# Patient Record
Sex: FEMALE | Race: BLACK OR AFRICAN AMERICAN | NOT HISPANIC OR LATINO | Employment: OTHER | URBAN - METROPOLITAN AREA
[De-identification: names, ages, dates, MRNs, and addresses within clinical notes are randomized per-mention and may not be internally consistent; named-entity substitution may affect disease eponyms.]

---

## 2017-08-09 ENCOUNTER — HOSPITAL ENCOUNTER (INPATIENT)
Facility: HOSPITAL | Age: 82
LOS: 3 days | DRG: 378 | End: 2017-08-12
Admitting: HOSPITALIST
Payer: MEDICARE

## 2017-08-09 DIAGNOSIS — R53.1 WEAKNESS: ICD-10-CM

## 2017-08-09 DIAGNOSIS — K92.2 GASTROINTESTINAL HEMORRHAGE, UNSPECIFIED GASTROINTESTINAL HEMORRHAGE TYPE: Primary | ICD-10-CM

## 2017-08-09 LAB
ABO + RH BLD: NORMAL
ALBUMIN SERPL BCP-MCNC: 2.8 G/DL
ALP SERPL-CCNC: 80 U/L
ALT SERPL W/O P-5'-P-CCNC: <5 U/L
ANION GAP SERPL CALC-SCNC: 9 MMOL/L
AST SERPL-CCNC: 9 U/L
BASOPHILS # BLD AUTO: 0.03 K/UL
BASOPHILS NFR BLD: 0.4 %
BILIRUB SERPL-MCNC: 0.3 MG/DL
BLD GP AB SCN CELLS X3 SERPL QL: NORMAL
BUN SERPL-MCNC: 24 MG/DL
CALCIUM SERPL-MCNC: 8.7 MG/DL
CHLORIDE SERPL-SCNC: 109 MMOL/L
CO2 SERPL-SCNC: 24 MMOL/L
CREAT SERPL-MCNC: 1.1 MG/DL
DIFFERENTIAL METHOD: ABNORMAL
EOSINOPHIL # BLD AUTO: 0.4 K/UL
EOSINOPHIL NFR BLD: 4.7 %
ERYTHROCYTE [DISTWIDTH] IN BLOOD BY AUTOMATED COUNT: 19.7 %
EST. GFR  (AFRICAN AMERICAN): 49 ML/MIN/1.73 M^2
EST. GFR  (NON AFRICAN AMERICAN): 43 ML/MIN/1.73 M^2
GLUCOSE SERPL-MCNC: 88 MG/DL
HCT VFR BLD AUTO: 27.2 %
HGB BLD-MCNC: 8.3 G/DL
INR PPP: 1.2
LYMPHOCYTES # BLD AUTO: 1.1 K/UL
LYMPHOCYTES NFR BLD: 13.8 %
MCH RBC QN AUTO: 23.2 PG
MCHC RBC AUTO-ENTMCNC: 30.5 G/DL
MCV RBC AUTO: 76 FL
MONOCYTES # BLD AUTO: 0.5 K/UL
MONOCYTES NFR BLD: 6.6 %
NEUTROPHILS # BLD AUTO: 5.7 K/UL
NEUTROPHILS NFR BLD: 74.4 %
PLATELET # BLD AUTO: 243 K/UL
PMV BLD AUTO: 10.6 FL
POTASSIUM SERPL-SCNC: 4.3 MMOL/L
PROT SERPL-MCNC: 6 G/DL
PROTHROMBIN TIME: 12.8 SEC
RBC # BLD AUTO: 3.58 M/UL
SODIUM SERPL-SCNC: 142 MMOL/L
WBC # BLD AUTO: 7.67 K/UL

## 2017-08-09 PROCEDURE — 96365 THER/PROPH/DIAG IV INF INIT: CPT

## 2017-08-09 PROCEDURE — 96366 THER/PROPH/DIAG IV INF ADDON: CPT

## 2017-08-09 PROCEDURE — 12000002 HC ACUTE/MED SURGE SEMI-PRIVATE ROOM

## 2017-08-09 PROCEDURE — 85025 COMPLETE CBC W/AUTO DIFF WBC: CPT

## 2017-08-09 PROCEDURE — 63600175 PHARM REV CODE 636 W HCPCS

## 2017-08-09 PROCEDURE — C9113 INJ PANTOPRAZOLE SODIUM, VIA: HCPCS

## 2017-08-09 PROCEDURE — 86850 RBC ANTIBODY SCREEN: CPT

## 2017-08-09 PROCEDURE — 96361 HYDRATE IV INFUSION ADD-ON: CPT

## 2017-08-09 PROCEDURE — 86900 BLOOD TYPING SEROLOGIC ABO: CPT

## 2017-08-09 PROCEDURE — 93010 ELECTROCARDIOGRAM REPORT: CPT | Mod: ,,, | Performed by: INTERNAL MEDICINE

## 2017-08-09 PROCEDURE — 80053 COMPREHEN METABOLIC PANEL: CPT

## 2017-08-09 PROCEDURE — 83036 HEMOGLOBIN GLYCOSYLATED A1C: CPT

## 2017-08-09 PROCEDURE — 85610 PROTHROMBIN TIME: CPT

## 2017-08-09 PROCEDURE — 99285 EMERGENCY DEPT VISIT HI MDM: CPT | Mod: 25

## 2017-08-09 PROCEDURE — 96376 TX/PRO/DX INJ SAME DRUG ADON: CPT

## 2017-08-09 PROCEDURE — 93005 ELECTROCARDIOGRAM TRACING: CPT

## 2017-08-09 PROCEDURE — 25000003 PHARM REV CODE 250

## 2017-08-09 RX ORDER — ASPIRIN 325 MG
325 TABLET ORAL DAILY
Status: ON HOLD | COMMUNITY
End: 2017-08-11 | Stop reason: HOSPADM

## 2017-08-09 RX ORDER — SENNOSIDES 8.6 MG/1
1 TABLET ORAL DAILY
COMMUNITY

## 2017-08-09 RX ORDER — HYDROCODONE BITARTRATE AND ACETAMINOPHEN 5; 325 MG/1; MG/1
1 TABLET ORAL EVERY 6 HOURS PRN
Status: ON HOLD | COMMUNITY
End: 2017-08-11

## 2017-08-09 RX ORDER — SODIUM CHLORIDE 9 MG/ML
1000 INJECTION, SOLUTION INTRAVENOUS
Status: COMPLETED | OUTPATIENT
Start: 2017-08-09 | End: 2017-08-09

## 2017-08-09 RX ORDER — ERGOCALCIFEROL 1.25 MG/1
50000 CAPSULE ORAL
COMMUNITY

## 2017-08-09 RX ORDER — POLYETHYLENE GLYCOL 3350 17 G/17G
POWDER, FOR SOLUTION ORAL
COMMUNITY

## 2017-08-09 RX ORDER — PANTOPRAZOLE SODIUM 40 MG/10ML
40 INJECTION, POWDER, LYOPHILIZED, FOR SOLUTION INTRAVENOUS
Status: COMPLETED | OUTPATIENT
Start: 2017-08-09 | End: 2017-08-09

## 2017-08-09 RX ADMIN — PANTOPRAZOLE SODIUM 8 MG/HR: 40 INJECTION, POWDER, FOR SOLUTION INTRAVENOUS at 09:08

## 2017-08-09 RX ADMIN — SODIUM CHLORIDE 1000 ML: 0.9 INJECTION, SOLUTION INTRAVENOUS at 08:08

## 2017-08-09 RX ADMIN — PANTOPRAZOLE SODIUM 40 MG: 40 INJECTION, POWDER, FOR SOLUTION INTRAVENOUS at 08:08

## 2017-08-10 ENCOUNTER — ANESTHESIA EVENT (OUTPATIENT)
Dept: ENDOSCOPY | Facility: HOSPITAL | Age: 82
DRG: 378 | End: 2017-08-10
Payer: MEDICARE

## 2017-08-10 ENCOUNTER — SURGERY (OUTPATIENT)
Age: 82
End: 2017-08-10

## 2017-08-10 ENCOUNTER — ANESTHESIA (OUTPATIENT)
Dept: ENDOSCOPY | Facility: HOSPITAL | Age: 82
DRG: 378 | End: 2017-08-10
Payer: MEDICARE

## 2017-08-10 PROBLEM — D62 ACUTE BLOOD LOSS ANEMIA: Status: ACTIVE | Noted: 2017-08-10

## 2017-08-10 LAB
ESTIMATED AVG GLUCOSE: 134 MG/DL
HBA1C MFR BLD HPLC: 6.3 %
POCT GLUCOSE: 100 MG/DL (ref 70–110)
POCT GLUCOSE: 114 MG/DL (ref 70–110)
POCT GLUCOSE: 236 MG/DL (ref 70–110)
POCT GLUCOSE: 266 MG/DL (ref 70–110)

## 2017-08-10 PROCEDURE — 63600175 PHARM REV CODE 636 W HCPCS

## 2017-08-10 PROCEDURE — 63600175 PHARM REV CODE 636 W HCPCS: Performed by: NURSE ANESTHETIST, CERTIFIED REGISTERED

## 2017-08-10 PROCEDURE — 25000003 PHARM REV CODE 250

## 2017-08-10 PROCEDURE — C9113 INJ PANTOPRAZOLE SODIUM, VIA: HCPCS

## 2017-08-10 PROCEDURE — 43235 EGD DIAGNOSTIC BRUSH WASH: CPT | Performed by: INTERNAL MEDICINE

## 2017-08-10 PROCEDURE — 36415 COLL VENOUS BLD VENIPUNCTURE: CPT

## 2017-08-10 PROCEDURE — D9220A PRA ANESTHESIA: Mod: CRNA,,, | Performed by: NURSE ANESTHETIST, CERTIFIED REGISTERED

## 2017-08-10 PROCEDURE — 86677 HELICOBACTER PYLORI ANTIBODY: CPT

## 2017-08-10 PROCEDURE — D9220A PRA ANESTHESIA: Mod: ANES,,, | Performed by: ANESTHESIOLOGY

## 2017-08-10 PROCEDURE — 0DJ08ZZ INSPECTION OF UPPER INTESTINAL TRACT, VIA NATURAL OR ARTIFICIAL OPENING ENDOSCOPIC: ICD-10-PCS | Performed by: INTERNAL MEDICINE

## 2017-08-10 PROCEDURE — 25000003 PHARM REV CODE 250: Performed by: HOSPITALIST

## 2017-08-10 PROCEDURE — 37000009 HC ANESTHESIA EA ADD 15 MINS: Performed by: INTERNAL MEDICINE

## 2017-08-10 PROCEDURE — 11000001 HC ACUTE MED/SURG PRIVATE ROOM

## 2017-08-10 PROCEDURE — 25000003 PHARM REV CODE 250: Performed by: NURSE ANESTHETIST, CERTIFIED REGISTERED

## 2017-08-10 PROCEDURE — 37000008 HC ANESTHESIA 1ST 15 MINUTES: Performed by: INTERNAL MEDICINE

## 2017-08-10 PROCEDURE — 93005 ELECTROCARDIOGRAM TRACING: CPT

## 2017-08-10 RX ORDER — LIDOCAINE HCL/PF 100 MG/5ML
SYRINGE (ML) INTRAVENOUS
Status: DISCONTINUED | OUTPATIENT
Start: 2017-08-10 | End: 2017-08-10

## 2017-08-10 RX ORDER — PROPOFOL 10 MG/ML
VIAL (ML) INTRAVENOUS
Status: DISCONTINUED | OUTPATIENT
Start: 2017-08-10 | End: 2017-08-10

## 2017-08-10 RX ORDER — PROPOFOL 10 MG/ML
VIAL (ML) INTRAVENOUS
Status: COMPLETED
Start: 2017-08-10 | End: 2017-08-10

## 2017-08-10 RX ORDER — SODIUM CHLORIDE 9 MG/ML
INJECTION, SOLUTION INTRAVENOUS CONTINUOUS PRN
Status: DISCONTINUED | OUTPATIENT
Start: 2017-08-10 | End: 2017-08-10

## 2017-08-10 RX ORDER — HYDROCODONE BITARTRATE AND ACETAMINOPHEN 5; 325 MG/1; MG/1
1 TABLET ORAL EVERY 6 HOURS PRN
Status: DISCONTINUED | OUTPATIENT
Start: 2017-08-10 | End: 2017-08-12 | Stop reason: HOSPADM

## 2017-08-10 RX ORDER — GLUCAGON 1 MG
1 KIT INJECTION
Status: DISCONTINUED | OUTPATIENT
Start: 2017-08-10 | End: 2017-08-12 | Stop reason: HOSPADM

## 2017-08-10 RX ORDER — AMLODIPINE BESYLATE 5 MG/1
10 TABLET ORAL DAILY
Status: DISCONTINUED | OUTPATIENT
Start: 2017-08-10 | End: 2017-08-12 | Stop reason: HOSPADM

## 2017-08-10 RX ORDER — ATORVASTATIN CALCIUM 10 MG/1
20 TABLET, FILM COATED ORAL DAILY
Status: DISCONTINUED | OUTPATIENT
Start: 2017-08-10 | End: 2017-08-12 | Stop reason: HOSPADM

## 2017-08-10 RX ORDER — CARVEDILOL 6.25 MG/1
12.5 TABLET ORAL 2 TIMES DAILY WITH MEALS
Status: DISCONTINUED | OUTPATIENT
Start: 2017-08-10 | End: 2017-08-12 | Stop reason: HOSPADM

## 2017-08-10 RX ORDER — LIDOCAINE HYDROCHLORIDE 20 MG/ML
INJECTION, SOLUTION EPIDURAL; INFILTRATION; INTRACAUDAL; PERINEURAL
Status: COMPLETED
Start: 2017-08-10 | End: 2017-08-10

## 2017-08-10 RX ADMIN — SODIUM CHLORIDE: 0.9 INJECTION, SOLUTION INTRAVENOUS at 08:08

## 2017-08-10 RX ADMIN — CARVEDILOL 12.5 MG: 6.25 TABLET, FILM COATED ORAL at 11:08

## 2017-08-10 RX ADMIN — CARVEDILOL 12.5 MG: 6.25 TABLET, FILM COATED ORAL at 05:08

## 2017-08-10 RX ADMIN — LIDOCAINE HYDROCHLORIDE 50 MG: 20 INJECTION, SOLUTION INTRAVENOUS at 10:08

## 2017-08-10 RX ADMIN — PANTOPRAZOLE SODIUM 8 MG/HR: 40 INJECTION, POWDER, FOR SOLUTION INTRAVENOUS at 05:08

## 2017-08-10 RX ADMIN — HYDROCODONE BITARTRATE AND ACETAMINOPHEN 1 TABLET: 5; 325 TABLET ORAL at 06:08

## 2017-08-10 RX ADMIN — PROPOFOL 50 MG: 10 INJECTION, EMULSION INTRAVENOUS at 10:08

## 2017-08-10 RX ADMIN — PROPOFOL 20 MG: 10 INJECTION, EMULSION INTRAVENOUS at 10:08

## 2017-08-10 RX ADMIN — ATORVASTATIN CALCIUM 20 MG: 10 TABLET, FILM COATED ORAL at 11:08

## 2017-08-10 RX ADMIN — PROPOFOL: 10 INJECTION, EMULSION INTRAVENOUS at 09:08

## 2017-08-10 RX ADMIN — AMLODIPINE BESYLATE 10 MG: 5 TABLET ORAL at 11:08

## 2017-08-10 RX ADMIN — LIDOCAINE HYDROCHLORIDE: 20 INJECTION, SOLUTION EPIDURAL; INFILTRATION; INTRACAUDAL; PERINEURAL at 09:08

## 2017-08-10 NOTE — PROVIDER PROGRESS NOTES - EMERGENCY DEPT.
Encounter Date: 8/9/2017    ED Physician Progress Notes        Physician Note:   We have now identified that the Xarelto  Was stopped Today at the nursing home.  Son is present and son the consent for blood if needed.  NO labs sent from NH    Dr. Ellis admit, q4 h/h consult GI    Dr. Jonny HAIDER , keep npo and EGD in a.m.

## 2017-08-10 NOTE — SUBJECTIVE & OBJECTIVE
Past Medical History:   Diagnosis Date    Anemia     Arthritis     Diabetes mellitus     Encounter for blood transfusion     History of blood clots     Hyperlipidemia     Hypertension     Stroke     25yrs ago       Past Surgical History:   Procedure Laterality Date    APPENDECTOMY      ILIAC ARTERY STENT         Review of patient's allergies indicates:  No Known Allergies    No current facility-administered medications on file prior to encounter.      Current Outpatient Prescriptions on File Prior to Encounter   Medication Sig    amlodipine (NORVASC) 10 MG tablet Take 10 mg by mouth once daily.    atorvastatin (LIPITOR) 20 MG tablet Take 20 mg by mouth once daily.    carvedilol (COREG) 12.5 MG tablet Take 12.5 mg by mouth 2 (two) times daily with meals.    gabapentin (NEURONTIN) 300 MG capsule     omeprazole (PRILOSEC) 20 MG capsule     albuterol-ipratropium 2.5mg-0.5mg/3mL (DUO-NEB) 0.5 mg-3 mg(2.5 mg base)/3 mL nebulizer solution Take 3 mLs by nebulization every 4 (four) hours.    clopidogrel (PLAVIX) 75 mg tablet Take 75 mg by mouth once daily.    insulin glargine (LANTUS) 100 unit/mL injection Inject 10 Units into the skin every evening.    MYRBETRIQ 50 mg Tb24     rivaroxaban (XARELTO) 20 mg Tab Take 1 tablet (20 mg total) by mouth once daily.    trazodone (DESYREL) 50 MG tablet Take 25 mg by mouth every evening.      Family History     None        Social History Main Topics    Smoking status: Former Smoker    Smokeless tobacco: Never Used    Alcohol use No    Drug use: No    Sexual activity: Not on file     Review of Systems   Constitutional: Negative for activity change and appetite change.   HENT: Negative for congestion and dental problem.    Eyes: Negative for discharge and itching.   Respiratory: Negative for apnea and chest tightness.    Cardiovascular: Negative for chest pain and leg swelling.   Gastrointestinal: Positive for blood in stool. Negative for abdominal distention  and abdominal pain.   Endocrine: Negative for cold intolerance and heat intolerance.   Genitourinary: Negative for difficulty urinating.   Musculoskeletal: Negative for arthralgias.   Skin: Negative for color change.   Allergic/Immunologic: Negative for environmental allergies.   Neurological: Negative for dizziness and facial asymmetry.   Hematological: Negative for adenopathy.   Psychiatric/Behavioral: Negative for agitation and behavioral problems.     Objective:     Vital Signs (Most Recent):  Temp: 97.4 °F (36.3 °C) (08/10/17 0856)  Pulse: (P) 62 (08/10/17 0900)  Resp: 18 (08/10/17 0856)  BP: (!) 180/74 (08/10/17 0900)  SpO2: 98 % (08/10/17 0856) Vital Signs (24h Range):  Temp:  [97.4 °F (36.3 °C)-98.2 °F (36.8 °C)] 97.4 °F (36.3 °C)  Pulse:  [60-68] (P) 62  Resp:  [16-25] 18  SpO2:  [95 %-99 %] 98 %  BP: (128-180)/(64-80) 180/74     Weight: 61.2 kg (135 lb)  Body mass index is 23.17 kg/m².    Physical Exam   Constitutional: No distress.   HENT:   Head: Atraumatic.   Eyes: EOM are normal. Pupils are equal, round, and reactive to light.   Neck: Normal range of motion. Neck supple.   Cardiovascular: Normal rate and regular rhythm.    Pulmonary/Chest: Effort normal and breath sounds normal.   Abdominal: Soft. Bowel sounds are normal. She exhibits no distension. There is no tenderness.   Musculoskeletal: Normal range of motion. She exhibits no deformity.   Neurological: She is alert. No cranial nerve deficit. Coordination normal.   Skin: Skin is warm and dry. She is not diaphoretic.   Psychiatric: She has a normal mood and affect. Her behavior is normal.        Significant Labs:   BMP:   Recent Labs  Lab 08/09/17 1930   GLU 88      K 4.3      CO2 24   BUN 24   CREATININE 1.1   CALCIUM 8.7     CBC:   Recent Labs  Lab 08/09/17 1930   WBC 7.67   HGB 8.3*   HCT 27.2*

## 2017-08-10 NOTE — ANESTHESIA POSTPROCEDURE EVALUATION
"Anesthesia Post Evaluation    Patient: Kiki Rosario    Procedure(s) Performed: Procedure(s) (LRB):  ESOPHAGOGASTRODUODENOSCOPY (EGD) (Left)    Final Anesthesia Type: general  Patient location during evaluation: GI PACU  Patient participation: Yes- Able to Participate  Level of consciousness: awake and alert  Post-procedure vital signs: reviewed and stable  Pain management: adequate  Airway patency: patent  PONV status at discharge: No PONV  Anesthetic complications: no      Cardiovascular status: blood pressure returned to baseline and hemodynamically stable  Respiratory status: unassisted  Hydration status: euvolemic  Follow-up not needed.        Visit Vitals  BP (!) 113/53   Pulse 61   Temp 36.6 °C (97.9 °F)   Resp 18   Ht 5' 4" (1.626 m)   Wt 61.2 kg (135 lb)   LMP  (LMP Unknown)   SpO2 96%   Breastfeeding? No   BMI 23.17 kg/m²       Pain/Nguyễn Score: Pain Assessment Performed: Yes (8/10/2017 10:30 AM)  Presence of Pain: denies (8/10/2017 10:30 AM)  Nguyễn Score: 9 (8/10/2017 10:30 AM)      "

## 2017-08-10 NOTE — ED PROVIDER NOTES
Encounter Date: 8/9/2017    SCRIBE #1 NOTE: I, Bakari Perez, am scribing for, and in the presence of,  Jamie Goetz MD. I have scribed the following portions of the note - Other sections scribed: HPI and ROS.       History     Chief Complaint   Patient presents with    Abnormal Lab     Pt sent to ED for evaluation of low H&H (labs not sent) from [facility] due to heavy vaginal bleeding.      CC: Abnormal Lab     HPI: This 89 y.o F with Anemia; Arthritis; DM; Encounter for blood transfusion; History of blood clots; HLD; HTN; and Stroke presents to the ED via EMS from [facility] for emergent evaluation of low H&H. The labs were not sent, but the pt's nursing home reports heavy vaginal bleeding. Pt's last meal was yesterday. The pt denies nausea, emesis, abdominal pain, chest pain, fever, chills, sore throat, rhinorrhea, cough and chest. No prior tx.     Pt takes Aspirin daily.         The history is provided by the patient, the EMS personnel and the nursing home. No  was used.     Review of patient's allergies indicates:  No Known Allergies  Past Medical History:   Diagnosis Date    Anemia     Arthritis     Diabetes mellitus     Encounter for blood transfusion     History of blood clots     Hyperlipidemia     Hypertension     Stroke     25yrs ago     Past Surgical History:   Procedure Laterality Date    APPENDECTOMY      ILIAC ARTERY STENT       History reviewed. No pertinent family history.  Social History   Substance Use Topics    Smoking status: Former Smoker    Smokeless tobacco: Never Used    Alcohol use No     Review of Systems   Constitutional: Negative for chills, diaphoresis and fever.   HENT: Negative for rhinorrhea and sore throat.    Eyes: Negative for redness.   Respiratory: Negative for cough and shortness of breath.    Cardiovascular: Negative for chest pain.   Gastrointestinal: Negative for abdominal pain, diarrhea, nausea and vomiting.   Genitourinary: Positive  for vaginal bleeding. Negative for dysuria, frequency and urgency.   Musculoskeletal: Negative for back pain and neck pain.   Skin: Negative for rash.   Psychiatric/Behavioral: The patient is not nervous/anxious.        Physical Exam     Initial Vitals [08/09/17 1819]   BP Pulse Resp Temp SpO2   (!) 141/74 64 18 98.2 °F (36.8 °C) 98 %      MAP       96.33         Physical Exam well-developed well-nourished black female awake slightly confused  HEENT exam  bilateral iridectomies  Movements full TMs ceruminous nares benign dentures in place moist mucous membranes posterior pharynx benign  Neck no significant adenopathy  Lungs clear no rales rhonchi or wheezing next lung cardiovascular regular rate and rhythm no murmur rub or gallop  Abdomen bowel sounds positive soft nontender no masses or prostatomegaly   no evidence of bleeding and vaginal vault somewhat atrophic meatus without evidence trauma  Rectal Black melanotic stool immediately heme positive no masses  Musca skeletal well-healed right BKA  Skin without breakdown    ED Course   Procedures  Labs Reviewed   CBC W/ AUTO DIFFERENTIAL - Abnormal; Notable for the following:        Result Value    RBC 3.58 (*)     Hemoglobin 8.3 (*)     Hematocrit 27.2 (*)     MCV 76 (*)     MCH 23.2 (*)     MCHC 30.5 (*)     RDW 19.7 (*)     Gran% 74.4 (*)     Lymph% 13.8 (*)     All other components within normal limits   COMPREHENSIVE METABOLIC PANEL - Abnormal; Notable for the following:     Albumin 2.8 (*)     AST 9 (*)     ALT <5 (*)     eGFR if  49 (*)     eGFR if non  43 (*)     All other components within normal limits   PROTIME-INR - Abnormal; Notable for the following:     Prothrombin Time 12.8 (*)     All other components within normal limits   TYPE & SCREEN     EKG Readings: (Independently Interpreted)   Sinus rhythm 64 bpm monitor Nitropaste or possibly normal QT early criteria for LVH nonspecific ST-T check              MDM patient  with history of nursing home labs showing low H&H.  Patient states she had nothing to eat today she just didn't want to.  Nursing home center of vaginal bleeding but on physical examination appears to be having melanotic stools.  Concerning for peptic ulcer disease.  Old notes reveals she is on Zarontin 2014 the current nursing home records reveal only Plavix.  Differential diagnosis includes AV malformations Crohn's ulcerative colitis duodenal ulcer          Scribe Attestation:   Scribe #1: I performed the above scribed service and the documentation accurately describes the services I performed. I attest to the accuracy of the note.    Attending Attestation:           Physician Attestation for Scribe:  Physician Attestation Statement for Scribe #1: I, Jamie Goetz MD, reviewed documentation, as scribed by Bakari Perez in my presence, and it is both accurate and complete.                 ED Course     Clinical Impression:   The primary encounter diagnosis was Gastrointestinal hemorrhage, unspecified gastrointestinal hemorrhage type. A diagnosis of Weakness was also pertinent to this visit.                           Jamie Goetz MD  08/09/17 1626       Jamie Goetz MD  08/09/17 4443

## 2017-08-10 NOTE — ED TRIAGE NOTES
Pt presents to the ed with complaints of vaginal bleeding from [facility] that started today according to family. Upon examination, pt found to be bleeding from rectum. Pt had blood work done earlier today that showed low H&H. Pt AAOx2, able to answer some questions. Pt had bloody diaper with dark red stools and clots present when roomed. Pt skin warm and dry.

## 2017-08-10 NOTE — ANESTHESIA PREPROCEDURE EVALUATION
08/10/2017  Kiki Rosario is a 89 y.o., female.    Anesthesia Evaluation    I have reviewed the Patient Summary Reports.    I have reviewed the Nursing Notes.      Review of Systems  Anesthesia Hx:  No problems with previous Anesthesia    Social:  Non-Smoker    Hematology/Oncology:         -- Anemia: Hematology Comments: Pt on xarelto (remote hx of PE) & plavix (lower extremity stent)   Cardiovascular:   Exercise tolerance: poor Denies Pacemaker. Hypertension  Denies Valvular problems/Murmurs.  Denies MI.  Denies CAD.    Denies CABG/stent.   Denies Angina. CHF            Pulmonary:  Pulmonary Normal    Renal/:   Chronic Renal Disease, CRI    Hepatic/GI:   Denies Liver Disease. Denies Hepatitis. Pt admitted w/ ?black stools, & vaginal bleeding   Neurological:   CVA Denies Seizures.    Endocrine:   Diabetes, type 2 Denies Hypothyroidism. Denies Hyperthyroidism.    Psych:   Psychiatric History          Physical Exam  General:  Well nourished    Airway/Jaw/Neck:  AIRWAY FINDINGS: Normal           Mental Status:  Pt know who she is and where we are but is not clear why we are doing procedure or who is president       Anesthesia Plan  Type of Anesthesia, risks & benefits discussed:  Anesthesia Type:  general  Patient's Preference:   Intra-op Monitoring Plan: standard ASA monitors  Intra-op Monitoring Plan Comments:   Post Op Pain Control Plan:   Post Op Pain Control Plan Comments:   Induction:   IV  Beta Blocker:  Patient is on a Beta-Blocker and has received one dose within the past 24 hours (No further documentation required).       Informed Consent: Patient representative understands risks and agrees with Anesthesia plan.  Questions answered. Anesthesia consent signed with patient representative.  ASA Score: 3     Day of Surgery Review of History & Physical:    H&P update referred to the surgeon.      Anesthesia Plan Notes: Discussed risks of Anesthesia with pt's granddaughter, she gave consent        Ready For Surgery From Anesthesia Perspective.

## 2017-08-10 NOTE — CONSULTS
"Chief Complaint:  "My blood levels were low."    HPI:  The patient is a 89 year old woman with a history of HTN, DM, CVA, and arthritis presenting with anemia.  She was at the nursing home and she was found to have an anemia.  Apparently, the nursing home stated she had heavy vaginal bleeding, however, when she arrived to the Emergency Room, it was thought that she had black stools.  The patient was on plavix and xarelto.  She denies having any abdominal pain, weight loss, nausea, emesis, diarrhea, or constipation.  The patient underwent a colonoscopy in 2011 for hematochezia and she had a poor prep with pandiverticulosis.    Past Medical History:   Diagnosis Date    Anemia     Arthritis     Diabetes mellitus     Encounter for blood transfusion     History of blood clots     Hyperlipidemia     Hypertension     Stroke     25yrs ago     Past Surgical History:   Procedure Laterality Date    APPENDECTOMY      ILIAC ARTERY STENT       FamHx:  No history of liver or colon cancer    SocialHx:  No tobacco, EtOH, or illicit drugs     amlodipine  10 mg Oral Daily    carvedilol  12.5 mg Oral BID WM     Review of patient's allergies indicates:  No Known Allergies    ROS:  No chest pain or dyspnea.  No dysuria.  No heartburn or dysphagia.  Otherwise as stated above.  Ten other systems negative.    Vitals:    08/10/17 0109 08/10/17 0139 08/10/17 0240 08/10/17 0257   BP: (!) 145/72 (!) 145/68 (!) 151/71 (!) 150/74   Pulse: 64 62 66 68   Resp: 17 (!) 23 (!) 25 20   Temp:    97.9 °F (36.6 °C)   TempSrc:    Oral   SpO2: 95% 98% 98% 96%   Weight:       Height:         P.E.:  GEN: A x O x 3, NAD  SKIN: No jaundice  HEENT: EOMI, PERRL, anicteric sclera  CV: RRR, no M/R/G  Chest: CTA B  Abdomen: soft, NTND, normoactive BS  Ext: No C/C/E.  2+ dorsalis pedis pulses B  Neuro: No asterixes or tremors.  CN II-XII intact  Musculoskeletal: 5/5 strength bilaterally    Labs:  Recent Results (from the past 336 hour(s))   CBC auto " differential    Collection Time: 08/09/17  7:30 PM   Result Value Ref Range    WBC 7.67 3.90 - 12.70 K/uL    Hemoglobin 8.3 (L) 12.0 - 16.0 g/dL    Hematocrit 27.2 (L) 37.0 - 48.5 %    Platelets 243 150 - 350 K/uL     CMP  Sodium   Date Value Ref Range Status   08/09/2017 142 136 - 145 mmol/L Final     Potassium   Date Value Ref Range Status   08/09/2017 4.3 3.5 - 5.1 mmol/L Final     Chloride   Date Value Ref Range Status   08/09/2017 109 95 - 110 mmol/L Final     CO2   Date Value Ref Range Status   08/09/2017 24 23 - 29 mmol/L Final     Glucose   Date Value Ref Range Status   08/09/2017 88 70 - 110 mg/dL Final     BUN, Bld   Date Value Ref Range Status   08/09/2017 24 10 - 30 mg/dL Final     Creatinine   Date Value Ref Range Status   08/09/2017 1.1 0.5 - 1.4 mg/dL Final     Calcium   Date Value Ref Range Status   08/09/2017 8.7 8.7 - 10.5 mg/dL Final     Total Protein   Date Value Ref Range Status   08/09/2017 6.0 6.0 - 8.4 g/dL Final     Albumin   Date Value Ref Range Status   08/09/2017 2.8 (L) 3.5 - 5.2 g/dL Final     Total Bilirubin   Date Value Ref Range Status   08/09/2017 0.3 0.1 - 1.0 mg/dL Final     Comment:     For infants and newborns, interpretation of results should be based  on gestational age, weight and in agreement with clinical  observations.  Premature Infant recommended reference ranges:  Up to 24 hours.............<8.0 mg/dL  Up to 48 hours............<12.0 mg/dL  3-5 days..................<15.0 mg/dL  6-29 days.................<15.0 mg/dL       Alkaline Phosphatase   Date Value Ref Range Status   08/09/2017 80 55 - 135 U/L Final     AST   Date Value Ref Range Status   08/09/2017 9 (L) 10 - 40 U/L Final     ALT   Date Value Ref Range Status   08/09/2017 <5 (L) 10 - 44 U/L Final     Anion Gap   Date Value Ref Range Status   08/09/2017 9 8 - 16 mmol/L Final     eGFR if    Date Value Ref Range Status   08/09/2017 49 (A) >60 mL/min/1.73 m^2 Final     eGFR if non African American    Date Value Ref Range Status   08/09/2017 43 (A) >60 mL/min/1.73 m^2 Final     Comment:     Calculation used to obtain the estimated glomerular filtration  rate (eGFR) is the CKD-EPI equation. Since race is unknown   in our information system, the eGFR values for   -American and Non--American patients are given   for each creatinine result.           Recent Labs  Lab 08/09/17  1930   INR 1.2     A/P: The patient is a 89 year old woman with a history of HTN, DM, CVA, and arthritis presenting with anemia.  1.  Anemia - it was initially reported from the nursing home that she has heavy vaginal bleeding.  In the Emergency Room, it was thought that she had black stools.  Plavix and xarelto are being held.  The protonix drip can be continued.  Despite her age, the patient wishes to undergo an EGD to evaluate for a source of bleeding.  I have explained the risks, benefits, and alternatives of the procedure in detail.  The patient voices understanding and all questions have been answered.  The patient agrees to proceed as planned.    Thank you for this consult.    Gastroenterology Addendum:  The son was contacted to obtain consents.  He is not sure if he would like for the patient to undergo an EGD at her age and he wishes to discuss this with other family members.  She can remain NPO for now until they make a decision.

## 2017-08-10 NOTE — ASSESSMENT & PLAN NOTE
She was on plavix and xarelto at home,likely upper GI,family did not want EGD,will continue with PPI drip,monitor CBC,pR BC as needed.hold a; anticoagulation.

## 2017-08-10 NOTE — TRANSFER OF CARE
"Anesthesia Transfer of Care Note    Patient: Kiki Rosario    Procedure(s) Performed: Procedure(s) (LRB):  ESOPHAGOGASTRODUODENOSCOPY (EGD) (Left)    Patient location: GI    Anesthesia Type: general    Transport from OR: Transported from OR on room air with adequate spontaneous ventilation    Post pain: adequate analgesia    Post assessment: no apparent anesthetic complications    Post vital signs: stable    Level of consciousness: awake and alert    Nausea/Vomiting: no nausea/vomiting    Complications: none    Transfer of care protocol was followed      Last vitals:   Visit Vitals  BP (!) 113/53   Pulse 61   Temp 36.6 °C (97.9 °F)   Resp 18   Ht 5' 4" (1.626 m)   Wt 61.2 kg (135 lb)   LMP  (LMP Unknown)   SpO2 96%   Breastfeeding? No   BMI 23.17 kg/m²     "

## 2017-08-10 NOTE — OR NURSING
EGD RESULTS DISCUSSED WITH RHONDA, CHARGE NURSE; HER PRIMARY CARE NURSE WAS UNAVAILABLE TO DISCUSS THE CASE PRESENTLY

## 2017-08-10 NOTE — HPI
This 89 y.o F with Anemia; Arthritis; DM; Encounter for blood transfusion; History of blood clots; HLD; HTN; and Stroke presents to the ED via EMS from [facility] for emergent evaluation of low H&H. The labs were not sent, but the pt's nursing home reports heavy vaginal bleeding. The pt denies nausea, emesis, abdominal pain, chest pain, fever, chills, sore throat, rhinorrhea, cough and chest. No prior tx,on further evaluation in ER,has been seen patient has no vaginal bleeding,but melanotic stool,she has been started on PPI drip and GI was consulted,paln was for EGD,but son did not want any procedure,no sign of active bleeding at this time.

## 2017-08-10 NOTE — DISCHARGE SUMMARY
[facility]  Discharge Summary      Admit Date: 8/9/2017    Discharge Date and Time:  08/10/2017 10:20 AM    Attending Physician: Dharmesh Swift MD     Reason for Admission: Anemia    Procedures Performed: Procedure(s) (LRB):  ESOPHAGOGASTRODUODENOSCOPY (EGD) (Left)    Hospital Course (synopsis of major diagnoses, care, treatment, and services provided during the course of the hospital stay): Ongoing     Consults: GI    Significant Diagnostic Studies: EGD    Final Diagnoses:    Principal Problem: Gastrointestinal hemorrhage   Secondary Diagnoses: EGD    Discharged Condition: good    Disposition: Admitted as an Inpatient    Follow Up/Patient Instructions: Follow-up with referring physician             Resume previous diet and activity.    Medications:  Transfer Medications (for Discharge Readmit only):   Current Facility-Administered Medications   Medication Dose Route Frequency Provider Last Rate Last Dose    amlodipine tablet 10 mg  10 mg Oral Daily Jamie Goetz MD        atorvastatin tablet 20 mg  20 mg Oral Daily Dharmesh Swift MD        carvedilol tablet 12.5 mg  12.5 mg Oral BID  Jamie Goetz MD        dextrose 50% injection 12.5 g  12.5 g Intravenous PRN Jamie Goetz MD        glucagon (human recombinant) injection 1 mg  1 mg Intramuscular PRN Jamie Goetz MD        lidocaine  20 mg/mL (2%) 20 mg/mL (2 %) injection             propofol (DIPRIVAN) 10 mg/mL IVP injection              Facility-Administered Medications Ordered in Other Encounters   Medication Dose Route Frequency Provider Last Rate Last Dose    0.9%  NaCl infusion    Continuous PRN Kimmie H Fischtziur, CRNA        lidocaine (cardiac) injection    PRN Kimmie ISAURO Fischtziur, CRNA   50 mg at 08/10/17 1013    propofol (DIPRIVAN) 10 mg/mL infusion    PRN Kimmie H Fischtziur, CRNA   20 mg at 08/10/17 1015     No discharge procedures on file.

## 2017-08-10 NOTE — H&P
[facility] Medicine  History & Physical    Patient Name: Kiki Rosario  MRN: [id number]  Admission Date: 8/9/2017  Attending Physician: Dharmesh Swift MD   Primary Care Provider: Karl Machuca MD         Patient information was obtained from patient and ER records.     Subjective:     Principal Problem:Gastrointestinal hemorrhage    Chief Complaint:   Chief Complaint   Patient presents with    Abnormal Lab     Pt sent to ED for evaluation of low H&H (labs not sent) from [facility] due to heavy vaginal bleeding.         HPI:  This 89 y.o F with Anemia; Arthritis; DM; Encounter for blood transfusion; History of blood clots; HLD; HTN; and Stroke presents to the ED via EMS from [facility] for emergent evaluation of low H&H. The labs were not sent, but the pt's nursing home reports heavy vaginal bleeding. The pt denies nausea, emesis, abdominal pain, chest pain, fever, chills, sore throat, rhinorrhea, cough and chest. No prior tx,on further evaluation in ER,has been seen patient has no vaginal bleeding,but melanotic stool,she has been started on PPI drip and GI was consulted,paln was for EGD,but son did not want any procedure,no sign of active bleeding at this time.    Past Medical History:   Diagnosis Date    Anemia     Arthritis     Diabetes mellitus     Encounter for blood transfusion     History of blood clots     Hyperlipidemia     Hypertension     Stroke     25yrs ago       Past Surgical History:   Procedure Laterality Date    APPENDECTOMY      ILIAC ARTERY STENT         Review of patient's allergies indicates:  No Known Allergies    No current facility-administered medications on file prior to encounter.      Current Outpatient Prescriptions on File Prior to Encounter   Medication Sig    amlodipine (NORVASC) 10 MG tablet Take 10 mg by mouth once daily.    atorvastatin (LIPITOR) 20 MG tablet Take 20 mg by mouth once daily.    carvedilol (COREG) 12.5 MG tablet  Take 12.5 mg by mouth 2 (two) times daily with meals.    gabapentin (NEURONTIN) 300 MG capsule     omeprazole (PRILOSEC) 20 MG capsule     albuterol-ipratropium 2.5mg-0.5mg/3mL (DUO-NEB) 0.5 mg-3 mg(2.5 mg base)/3 mL nebulizer solution Take 3 mLs by nebulization every 4 (four) hours.    clopidogrel (PLAVIX) 75 mg tablet Take 75 mg by mouth once daily.    insulin glargine (LANTUS) 100 unit/mL injection Inject 10 Units into the skin every evening.    MYRBETRIQ 50 mg Tb24     rivaroxaban (XARELTO) 20 mg Tab Take 1 tablet (20 mg total) by mouth once daily.    trazodone (DESYREL) 50 MG tablet Take 25 mg by mouth every evening.      Family History     None        Social History Main Topics    Smoking status: Former Smoker    Smokeless tobacco: Never Used    Alcohol use No    Drug use: No    Sexual activity: Not on file     Review of Systems   Constitutional: Negative for activity change and appetite change.   HENT: Negative for congestion and dental problem.    Eyes: Negative for discharge and itching.   Respiratory: Negative for apnea and chest tightness.    Cardiovascular: Negative for chest pain and leg swelling.   Gastrointestinal: Positive for blood in stool. Negative for abdominal distention and abdominal pain.   Endocrine: Negative for cold intolerance and heat intolerance.   Genitourinary: Negative for difficulty urinating.   Musculoskeletal: Negative for arthralgias.   Skin: Negative for color change.   Allergic/Immunologic: Negative for environmental allergies.   Neurological: Negative for dizziness and facial asymmetry.   Hematological: Negative for adenopathy.   Psychiatric/Behavioral: Negative for agitation and behavioral problems.     Objective:     Vital Signs (Most Recent):  Temp: 97.4 °F (36.3 °C) (08/10/17 0856)  Pulse: (P) 62 (08/10/17 0900)  Resp: 18 (08/10/17 0856)  BP: (!) 180/74 (08/10/17 0900)  SpO2: 98 % (08/10/17 0856) Vital Signs (24h Range):  Temp:  [97.4 °F (36.3 °C)-98.2 °F (36.8  °C)] 97.4 °F (36.3 °C)  Pulse:  [60-68] (P) 62  Resp:  [16-25] 18  SpO2:  [95 %-99 %] 98 %  BP: (128-180)/(64-80) 180/74     Weight: 61.2 kg (135 lb)  Body mass index is 23.17 kg/m².    Physical Exam   Constitutional: No distress.   HENT:   Head: Atraumatic.   Eyes: EOM are normal. Pupils are equal, round, and reactive to light.   Neck: Normal range of motion. Neck supple.   Cardiovascular: Normal rate and regular rhythm.    Pulmonary/Chest: Effort normal and breath sounds normal.   Abdominal: Soft. Bowel sounds are normal. She exhibits no distension. There is no tenderness.   Musculoskeletal: Normal range of motion. She exhibits no deformity.   Neurological: She is alert. No cranial nerve deficit. Coordination normal.   Skin: Skin is warm and dry. She is not diaphoretic.   Psychiatric: She has a normal mood and affect. Her behavior is normal.        Significant Labs:   BMP:   Recent Labs  Lab 08/09/17 1930   GLU 88      K 4.3      CO2 24   BUN 24   CREATININE 1.1   CALCIUM 8.7     CBC:   Recent Labs  Lab 08/09/17 1930   WBC 7.67   HGB 8.3*   HCT 27.2*              Assessment/Plan:     Hyperlipidemia    On statin.          * Gastrointestinal hemorrhage    She was on plavix and xarelto at home,likely upper GI,family did not want EGD,will continue with PPI drip,monitor CBC,pR BC as needed.hold a; anticoagulation.          Acute blood loss anemia    Monitors,duo to GI bleeding,PRBC as needed.          History of pulmonary embolism    Hold xarelto,PE was in 2014,may does not need any more,appear to be stable.          Essential hypertension    On home BP med;'s            VTE Risk Mitigation     None             Dharmesh Swift MD  Department of Hospital Medicine   [facility] [place]

## 2017-08-10 NOTE — PLAN OF CARE
Problem: Patient Care Overview  Goal: Plan of Care Review  Outcome: Ongoing (interventions implemented as appropriate)  Had EGD this a.m. Negative for blood. Patient urinates incontinent. No blood noted from vagina. Urine yellow. Had B.M. Brown in color. No blood noted. Monitor labs in a.m. Blood sugar checks ac/hs. GI on case. Alert but disoriented. Reorientation as needed. Turn and position q 2 hours.

## 2017-08-11 LAB
BASOPHILS # BLD AUTO: 0.01 K/UL
BASOPHILS NFR BLD: 0.1 %
DIFFERENTIAL METHOD: ABNORMAL
EOSINOPHIL # BLD AUTO: 0.2 K/UL
EOSINOPHIL NFR BLD: 2.8 %
ERYTHROCYTE [DISTWIDTH] IN BLOOD BY AUTOMATED COUNT: 19.2 %
HCT VFR BLD AUTO: 25.5 %
HGB BLD-MCNC: 7.6 G/DL
LYMPHOCYTES # BLD AUTO: 0.9 K/UL
LYMPHOCYTES NFR BLD: 12.3 %
MCH RBC QN AUTO: 22.7 PG
MCHC RBC AUTO-ENTMCNC: 29.8 G/DL
MCV RBC AUTO: 76 FL
MONOCYTES # BLD AUTO: 0.6 K/UL
MONOCYTES NFR BLD: 8.8 %
NEUTROPHILS # BLD AUTO: 5.3 K/UL
NEUTROPHILS NFR BLD: 76 %
PLATELET # BLD AUTO: 217 K/UL
PMV BLD AUTO: 10.1 FL
POCT GLUCOSE: 141 MG/DL (ref 70–110)
POCT GLUCOSE: 151 MG/DL (ref 70–110)
POCT GLUCOSE: 159 MG/DL (ref 70–110)
POCT GLUCOSE: 165 MG/DL (ref 70–110)
RBC # BLD AUTO: 3.35 M/UL
WBC # BLD AUTO: 7.02 K/UL

## 2017-08-11 PROCEDURE — 25000003 PHARM REV CODE 250: Performed by: HOSPITALIST

## 2017-08-11 PROCEDURE — 94761 N-INVAS EAR/PLS OXIMETRY MLT: CPT

## 2017-08-11 PROCEDURE — 63600175 PHARM REV CODE 636 W HCPCS: Performed by: HOSPITALIST

## 2017-08-11 PROCEDURE — C9113 INJ PANTOPRAZOLE SODIUM, VIA: HCPCS | Performed by: HOSPITALIST

## 2017-08-11 PROCEDURE — 25000003 PHARM REV CODE 250

## 2017-08-11 PROCEDURE — 11000001 HC ACUTE MED/SURG PRIVATE ROOM

## 2017-08-11 PROCEDURE — 85025 COMPLETE CBC W/AUTO DIFF WBC: CPT

## 2017-08-11 PROCEDURE — 36415 COLL VENOUS BLD VENIPUNCTURE: CPT

## 2017-08-11 RX ORDER — PANTOPRAZOLE SODIUM 40 MG/1
40 TABLET, DELAYED RELEASE ORAL 2 TIMES DAILY
Status: DISCONTINUED | OUTPATIENT
Start: 2017-08-11 | End: 2017-08-11

## 2017-08-11 RX ORDER — LISINOPRIL 2.5 MG/1
2.5 TABLET ORAL DAILY
Qty: 30 TABLET | Refills: 0
Start: 2017-08-11 | End: 2017-09-10

## 2017-08-11 RX ORDER — HYDROCODONE BITARTRATE AND ACETAMINOPHEN 5; 325 MG/1; MG/1
1 TABLET ORAL EVERY 6 HOURS PRN
Qty: 15 TABLET | Refills: 0 | Status: SHIPPED | OUTPATIENT
Start: 2017-08-11 | End: 2017-08-21

## 2017-08-11 RX ORDER — OMEPRAZOLE 20 MG/1
40 CAPSULE, DELAYED RELEASE ORAL DAILY
Qty: 30 CAPSULE | Refills: 0
Start: 2017-08-11 | End: 2017-09-10

## 2017-08-11 RX ADMIN — AMLODIPINE BESYLATE 10 MG: 5 TABLET ORAL at 08:08

## 2017-08-11 RX ADMIN — PANTOPRAZOLE SODIUM 8 MG/HR: 40 INJECTION, POWDER, FOR SOLUTION INTRAVENOUS at 04:08

## 2017-08-11 RX ADMIN — CARVEDILOL 12.5 MG: 6.25 TABLET, FILM COATED ORAL at 08:08

## 2017-08-11 RX ADMIN — PANTOPRAZOLE SODIUM 8 MG/HR: 40 INJECTION, POWDER, FOR SOLUTION INTRAVENOUS at 10:08

## 2017-08-11 RX ADMIN — ATORVASTATIN CALCIUM 20 MG: 10 TABLET, FILM COATED ORAL at 08:08

## 2017-08-11 RX ADMIN — PANTOPRAZOLE SODIUM 40 MG: 40 TABLET, DELAYED RELEASE ORAL at 08:08

## 2017-08-11 RX ADMIN — CARVEDILOL 12.5 MG: 6.25 TABLET, FILM COATED ORAL at 05:08

## 2017-08-11 RX ADMIN — PANTOPRAZOLE SODIUM 8 MG/HR: 40 INJECTION, POWDER, FOR SOLUTION INTRAVENOUS at 11:08

## 2017-08-11 NOTE — HOSPITAL COURSE
This 89 y.o F with Anemia; Arthritis; DM; Encounter for blood transfusion; History of blood clots; HLD; HTN; and Stroke presents to the ED via EMS from [facility] for emergent evaluation of low H&H. The labs were not sent, but the pt's nursing home reports heavy vaginal bleeding. The pt denies nausea, emesis, abdominal pain, chest pain, fever, chills, sore throat, rhinorrhea, cough and chest. No prior tx,on further evaluation in ER,has been seen patient has no vaginal bleeding,but melanotic stool,she has been started on PPI drip and GI was consulted,plan was for EGD,but son did not want any procedure,no sign of active bleeding during hospstalization has been seen,had some slight drop in H/H.but hemodynamically stable.discontinued oral anticoagulation,since PE was 3 years ago and patient was niot SOB or hypoxic, and discharged back to NH with PPI.informed and  updated family.

## 2017-08-11 NOTE — SUBJECTIVE & OBJECTIVE
Past Medical History:   Diagnosis Date    Anemia     Arthritis     Diabetes mellitus     Encounter for blood transfusion     History of blood clots     Hyperlipidemia     Hypertension     Stroke     25yrs ago       Past Surgical History:   Procedure Laterality Date    APPENDECTOMY      ILIAC ARTERY STENT         Review of patient's allergies indicates:  No Known Allergies    No current facility-administered medications on file prior to encounter.      Current Outpatient Prescriptions on File Prior to Encounter   Medication Sig    amlodipine (NORVASC) 10 MG tablet Take 10 mg by mouth once daily.    atorvastatin (LIPITOR) 20 MG tablet Take 20 mg by mouth once daily.    carvedilol (COREG) 12.5 MG tablet Take 12.5 mg by mouth 2 (two) times daily with meals.    gabapentin (NEURONTIN) 300 MG capsule     [DISCONTINUED] omeprazole (PRILOSEC) 20 MG capsule     albuterol-ipratropium 2.5mg-0.5mg/3mL (DUO-NEB) 0.5 mg-3 mg(2.5 mg base)/3 mL nebulizer solution Take 3 mLs by nebulization every 4 (four) hours.    insulin glargine (LANTUS) 100 unit/mL injection Inject 10 Units into the skin every evening.    MYRBETRIQ 50 mg Tb24     trazodone (DESYREL) 50 MG tablet Take 25 mg by mouth every evening.     [DISCONTINUED] clopidogrel (PLAVIX) 75 mg tablet Take 75 mg by mouth once daily.    [DISCONTINUED] rivaroxaban (XARELTO) 20 mg Tab Take 1 tablet (20 mg total) by mouth once daily.     Family History     None        Social History Main Topics    Smoking status: Former Smoker    Smokeless tobacco: Never Used    Alcohol use No    Drug use: No    Sexual activity: Not on file     Review of Systems   Constitutional: Negative for activity change and appetite change.   HENT: Negative for congestion and dental problem.    Eyes: Negative for discharge and itching.   Respiratory: Negative for apnea and chest tightness.    Cardiovascular: Negative for chest pain and leg swelling.   Gastrointestinal: Negative for  abdominal distention, abdominal pain and blood in stool.   Endocrine: Negative for cold intolerance and heat intolerance.   Genitourinary: Negative for difficulty urinating.   Musculoskeletal: Negative for arthralgias.   Skin: Negative for color change.   Allergic/Immunologic: Negative for environmental allergies.   Neurological: Negative for dizziness and facial asymmetry.   Hematological: Negative for adenopathy.   Psychiatric/Behavioral: Negative for agitation and behavioral problems.     Objective:     Vital Signs (Most Recent):  Temp: 97.9 °F (36.6 °C) (08/11/17 0432)  Pulse: 74 (08/11/17 0432)  Resp: 18 (08/11/17 0432)  BP: (!) 162/71 (08/11/17 0432)  SpO2: 96 % (08/11/17 0432) Vital Signs (24h Range):  Temp:  [97.4 °F (36.3 °C)-98.5 °F (36.9 °C)] 97.9 °F (36.6 °C)  Pulse:  [58-74] 74  Resp:  [16-20] 18  SpO2:  [63 %-98 %] 96 %  BP: (113-180)/(53-78) 162/71     Weight: 61.2 kg (135 lb)  Body mass index is 23.17 kg/m².    Physical Exam   Constitutional: No distress.   HENT:   Head: Atraumatic.   Eyes: EOM are normal. Pupils are equal, round, and reactive to light.   Neck: Normal range of motion. Neck supple.   Cardiovascular: Normal rate and regular rhythm.    Pulmonary/Chest: Effort normal and breath sounds normal.   Abdominal: Soft. Bowel sounds are normal. She exhibits no distension. There is no tenderness.   Musculoskeletal: Normal range of motion. She exhibits no deformity.   Neurological: She is alert. No cranial nerve deficit. Coordination normal.   Skin: Skin is warm and dry. She is not diaphoretic.   Psychiatric: She has a normal mood and affect. Her behavior is normal.        Significant Labs:   BMP:     Recent Labs  Lab 08/09/17 1930   GLU 88      K 4.3      CO2 24   BUN 24   CREATININE 1.1   CALCIUM 8.7     CBC:     Recent Labs  Lab 08/09/17 1930 08/11/17  0357   WBC 7.67 7.02   HGB 8.3* 7.6*   HCT 27.2* 25.5*    217

## 2017-08-11 NOTE — UM SECONDARY REVIEW
[facility]    PA - Medical Necessity Issue    LOS: approved w/o recommendations due to severity of clinical picture 9/15 late approval from Dr. Randle recieved   Mrs. Rosario is a  89 y.o with Anemia, ? GIB; History of Arthritis; DM; Anemia, blood transfusion, Blood clots; HLD; HTN; and Stroke. She was sent to the ED  from [facility] for emergent evaluation of low H&H. The labs were not sent, but the pt's nursing home reports heavy vaginal bleeding. The pt denies nausea, emesis, abdominal pain, chest pain, fever, chills, sore throat, rhinorrhea, cough and chest. No prior tx,on further evaluation in ER,has been seen patient has no vaginal bleeding, but melanotic stool, she has been started on PPI drip and GI was consulted, plan was for EGD,but son did not want any procedure, no sign of active bleeding during hospitalization has been seen sofar. Today her h/h slightly dropped from 8.3/27.2 to 7.6/25.5, Dr. Hercules wants to monitoring her for one more day and if she remains stable discharge her back to [facility] probably Saturday. She remains on PPI gtt

## 2017-08-11 NOTE — PLAN OF CARE
08/11/17 1709   Discharge Assessment   Assessment Type Discharge Planning Assessment   Confirmed/corrected address and phone number on facesheet? Yes   Assessment information obtained from? Medical Record   Prior to hospitilization cognitive status: Alert/Oriented   Prior to hospitalization functional status: Assistive Equipment;Needs Assistance   Current Functional Status: Assistive Equipment;Needs Assistance   Arrived From nursing home   Lives With facility resident   Able to Return to Prior Arrangements yes   Is patient able to care for self after discharge? Yes   How many people do you have in your home that can help with your care after discharge? 1   Who are your caregiver(s) and their phone number(s)? facility   Patient's perception of discharge disposition nursing home   Readmission Within The Last 30 Days no previous admission in last 30 days   Patient currently being followed by outpatient case management? No   Patient currently receives home health services? No   Does the patient currently use HME? No   Patient currently receives private duty nursing? N/A   Patient currently receives any other outside agency services? No   Equipment Currently Used at Home cane, straight;wheelchair   Do you have any problems affording any of your prescribed medications? No   Is the patient taking medications as prescribed? yes   Do you have any financial concerns preventing you from receiving the healthcare you need? No   Does the patient have transportation to healthcare appointments? Yes   Discharge Plan B Return to Nursing Home

## 2017-08-11 NOTE — PLAN OF CARE
[facility]     Department of Hospital Medicine     [address] [place] [address]     [phone] after hours  [phone] fax       NURSING HOME ORDERS    08/11/2017    Admit to Nursing Home:  Regular Bed                                                 Diagnoses:  Active Hospital Problems    Diagnosis  POA    *Gastrointestinal hemorrhage [K92.2]  Yes     Priority: 1 - High    Hyperlipidemia [E78.5]  Yes     Priority: Low    Acute blood loss anemia [D62]  Yes     Priority: 2     Essential hypertension [I10]  Yes    DM (diabetes mellitus) type II controlled peripheral vascular disorder [E11.51]  Yes    History of pulmonary embolism [Z86.711]  Yes      Resolved Hospital Problems    Diagnosis Date Resolved POA   No resolved problems to display.       Patient is homebound due to:  Gastrointestinal hemorrhage    Allergies:Review of patient's allergies indicates:  No Known Allergies    Vitals:       Every shift     Diet: ADA 2000 oswaldo     Acitivities:     - Up in a chair each morning as tolerated   - Ambulate with assistance to bathroom       - May use walker, cane, or self-propelled wheelchair   - Weight bearing:as tolerated.    LABS:  Per facility protocol    Nursing Precautions:     - Aspiration precautions:                         -  Upright 90 degrees befor during and after meals           - Fall precautions per nursing home protocol     - Decubitus precautions:        -  for positioning   - Pressure reducing foam mattress   - Turn patient every two hours. Use wedge pillows to anchor patient        MISCELLANEOUS CARE:                      Routine Skin for Bedridden Patients:  Apply moisture barrier cream to all    skin folds and wet areas in perineal area daily and after baths and                           all bowel movements.                                  DIABETES CARE:       Check blood sugar:    Fingerstick blood sugar a.m and p.m.     Fingerstick blood sugar AC and HS   Fingerstick blood sugar every 6 hours if unable to eat      Report CBG < 60 or > 400 to physician.                                          Insulin Sliding Scale          Glucose  Novolog Insulin Subcutaneous        0 - 60   Orange juice or glucose tablet, hold insulin      No insulin   201-250  2 units   251-300  4 units   301-350  6 units   351-400  8 units   >400   10 units then call physician      Medications: Discontinue all previous medication orders, if any. See new list below.     Kiki Rosario   Home Medication Instructions MARCELO:[id number]    Printed on:08/11/17 0800   Medication Information                      albuterol-ipratropium 2.5mg-0.5mg/3mL (DUO-NEB) 0.5 mg-3 mg(2.5 mg base)/3 mL nebulizer solution  Take 3 mLs by nebulization every 4 (four) hours.             amlodipine (NORVASC) 10 MG tablet  Take 10 mg by mouth once daily.             atorvastatin (LIPITOR) 20 MG tablet  Take 20 mg by mouth once daily.             carvedilol (COREG) 12.5 MG tablet  Take 12.5 mg by mouth 2 (two) times daily with meals.             ergocalciferol (VITAMIN D2) 50,000 unit Cap  Take 50,000 Units by mouth every 7 days.             estrogens, conjugated, (PREMARIN) 0.625 MG tablet  Take 0.625 mg by mouth once daily.             gabapentin (NEURONTIN) 300 MG capsule po daily                hydrocodone-acetaminophen 5-325mg (NORCO) 5-325 mg per tablet  Take 1 tablet by mouth every 6 (six) hours as needed for Pain.                          insulin glargine (LANTUS) 100 unit/mL injection  Inject 10 Units into the skin every evening.             lisinopril (PRINIVIL,ZESTRIL) 2.5 MG tablet  Take 1 tablet (2.5 mg total) by mouth once daily.                            omeprazole (PRILOSEC) 20 MG capsule  Take 2 capsules (40 mg total) by mouth once daily.             polyethylene glycol (GLYCOLAX) 17 gram PwPk  Take by mouth daily                           trazodone (DESYREL) 50  MG tablet  Take 25 mg by mouth every evening.                        _________________________________  Dharmesh Swift MD  08/11/2017

## 2017-08-11 NOTE — ASSESSMENT & PLAN NOTE
She was on plavix and xarelto at home,likely upper GI,family did not want EGD,will continue with PPI drip,monitor CBC,ME BC as needed.hold a; anticoagulation.

## 2017-08-11 NOTE — PLAN OF CARE
Problem: Patient Care Overview  Goal: Plan of Care Review  Outcome: Ongoing (interventions implemented as appropriate)  Pt remained free of falls/injury. Unable to review POC with pt because she is confused. Pt displayed no discomfort, and she denies pain when asked if she is hurting. Pt pulled IV out during shift, she was on no IV meds at this time. For safety measures, bed alarm activated, activity monitored during the night. She experienced no complications post-EGD.

## 2017-08-11 NOTE — PROGRESS NOTES
[facility] Medicine  Progress Note    Patient Name: Kiki Rosario  MRN: [id number]  Patient Class: IP- Inpatient   Admission Date: 8/9/2017  Length of Stay: 2 days  Attending Physician: Dharmesh Swift MD  Primary Care Provider: Karl Machuca MD        Subjective:     Principal Problem:Gastrointestinal hemorrhage    HPI:   This 89 y.o F with Anemia; Arthritis; DM; Encounter for blood transfusion; History of blood clots; HLD; HTN; and Stroke presents to the ED via EMS from [facility] for emergent evaluation of low H&H. The labs were not sent, but the pt's nursing home reports heavy vaginal bleeding. The pt denies nausea, emesis, abdominal pain, chest pain, fever, chills, sore throat, rhinorrhea, cough and chest. No prior tx,on further evaluation in ER,has been seen patient has no vaginal bleeding,but melanotic stool,she has been started on PPI drip and GI was consulted,paln was for EGD,but son did not want any procedure,no sign of active bleeding at this time.    Hospital Course:  This 89 y.o F with Anemia; Arthritis; DM; Encounter for blood transfusion; History of blood clots; HLD; HTN; and Stroke presents to the ED via EMS from [facility] for emergent evaluation of low H&H. The labs were not sent, but the pt's nursing home reports heavy vaginal bleeding. The pt denies nausea, emesis, abdominal pain, chest pain, fever, chills, sore throat, rhinorrhea, cough and chest. No prior tx,on further evaluation in ER,has been seen patient has no vaginal bleeding,but melanotic stool,she has been started on PPI drip and GI was consulted,plan was for EGD,but son did not want any procedure,no sign of active bleeding during hospstalization has been seen sofar.had some slight drop in H/H.but hemodynamically stable.    Past Medical History:   Diagnosis Date    Anemia     Arthritis     Diabetes mellitus     Encounter for blood transfusion     History of blood clots     Hyperlipidemia      Hypertension     Stroke     25yrs ago       Past Surgical History:   Procedure Laterality Date    APPENDECTOMY      ILIAC ARTERY STENT         Review of patient's allergies indicates:  No Known Allergies    No current facility-administered medications on file prior to encounter.      Current Outpatient Prescriptions on File Prior to Encounter   Medication Sig    amlodipine (NORVASC) 10 MG tablet Take 10 mg by mouth once daily.    atorvastatin (LIPITOR) 20 MG tablet Take 20 mg by mouth once daily.    carvedilol (COREG) 12.5 MG tablet Take 12.5 mg by mouth 2 (two) times daily with meals.    gabapentin (NEURONTIN) 300 MG capsule     [DISCONTINUED] omeprazole (PRILOSEC) 20 MG capsule     albuterol-ipratropium 2.5mg-0.5mg/3mL (DUO-NEB) 0.5 mg-3 mg(2.5 mg base)/3 mL nebulizer solution Take 3 mLs by nebulization every 4 (four) hours.    insulin glargine (LANTUS) 100 unit/mL injection Inject 10 Units into the skin every evening.    MYRBETRIQ 50 mg Tb24     trazodone (DESYREL) 50 MG tablet Take 25 mg by mouth every evening.     [DISCONTINUED] clopidogrel (PLAVIX) 75 mg tablet Take 75 mg by mouth once daily.    [DISCONTINUED] rivaroxaban (XARELTO) 20 mg Tab Take 1 tablet (20 mg total) by mouth once daily.     Family History     None        Social History Main Topics    Smoking status: Former Smoker    Smokeless tobacco: Never Used    Alcohol use No    Drug use: No    Sexual activity: Not on file     Review of Systems   Constitutional: Negative for activity change and appetite change.   HENT: Negative for congestion and dental problem.    Eyes: Negative for discharge and itching.   Respiratory: Negative for apnea and chest tightness.    Cardiovascular: Negative for chest pain and leg swelling.   Gastrointestinal: Negative for abdominal distention, abdominal pain and blood in stool.   Endocrine: Negative for cold intolerance and heat intolerance.   Genitourinary: Negative for difficulty urinating.    Musculoskeletal: Negative for arthralgias.   Skin: Negative for color change.   Allergic/Immunologic: Negative for environmental allergies.   Neurological: Negative for dizziness and facial asymmetry.   Hematological: Negative for adenopathy.   Psychiatric/Behavioral: Negative for agitation and behavioral problems.     Objective:     Vital Signs (Most Recent):  Temp: 97.9 °F (36.6 °C) (08/11/17 0432)  Pulse: 74 (08/11/17 0432)  Resp: 18 (08/11/17 0432)  BP: (!) 162/71 (08/11/17 0432)  SpO2: 96 % (08/11/17 0432) Vital Signs (24h Range):  Temp:  [97.4 °F (36.3 °C)-98.5 °F (36.9 °C)] 97.9 °F (36.6 °C)  Pulse:  [58-74] 74  Resp:  [16-20] 18  SpO2:  [63 %-98 %] 96 %  BP: (113-180)/(53-78) 162/71     Weight: 61.2 kg (135 lb)  Body mass index is 23.17 kg/m².    Physical Exam   Constitutional: No distress.   HENT:   Head: Atraumatic.   Eyes: EOM are normal. Pupils are equal, round, and reactive to light.   Neck: Normal range of motion. Neck supple.   Cardiovascular: Normal rate and regular rhythm.    Pulmonary/Chest: Effort normal and breath sounds normal.   Abdominal: Soft. Bowel sounds are normal. She exhibits no distension. There is no tenderness.   Musculoskeletal: Normal range of motion. She exhibits no deformity.   Neurological: She is alert. No cranial nerve deficit. Coordination normal.   Skin: Skin is warm and dry. She is not diaphoretic.   Psychiatric: She has a normal mood and affect. Her behavior is normal.        Significant Labs:   BMP:     Recent Labs  Lab 08/09/17 1930   GLU 88      K 4.3      CO2 24   BUN 24   CREATININE 1.1   CALCIUM 8.7     CBC:     Recent Labs  Lab 08/09/17 1930 08/11/17  0357   WBC 7.67 7.02   HGB 8.3* 7.6*   HCT 27.2* 25.5*    217           Assessment/Plan:      Hyperlipidemia    On statin.          * Gastrointestinal hemorrhage    She was on plavix and xarelto at home,likely upper GI,family did not want EGD,will continue with PPI drip,monitor CBC,NC BC as  needed.hold a; anticoagulation.          Acute blood loss anemia    Monitors,duo to GI bleeding,PRBC as needed.          DM (diabetes mellitus) type II controlled peripheral vascular disorder      On SSI.        History of pulmonary embolism    Hold xarelto,PE was in 2014,may does not need any more,appear to be stable.          Essential hypertension    On home BP med;'s            VTE Risk Mitigation     None              Dharmesh Swift MD  Department of Hospital Medicine   [facility]

## 2017-08-11 NOTE — PROGRESS NOTES
Message sent to Dot with [facility] to see if all clinicals and orders sent on today for review if pt can return on tomorrow Saturday 8/12/17.  Not available message left.    1059- dot responded stating that if all clinicals sent and are good it will not be a problem for the pt to return.  TN to fax clinicals and orders when available    1103- orders and d/c summary sent for review to Dot at [facility] via [facility].    9104- Dot notified TN that it will be no problem for the pt to return on tomorrow 8/12/17 Saturday.  Nurse here at hospital to call [phone] to 6th floor nurse.  Transportation will need to be scheduled when ready for discharge and per Dot pt travels wia w/c.  Transportation packet in slot at nurses station

## 2017-08-12 VITALS
SYSTOLIC BLOOD PRESSURE: 152 MMHG | HEIGHT: 64 IN | RESPIRATION RATE: 18 BRPM | WEIGHT: 135 LBS | DIASTOLIC BLOOD PRESSURE: 70 MMHG | TEMPERATURE: 98 F | OXYGEN SATURATION: 96 % | BODY MASS INDEX: 23.05 KG/M2 | HEART RATE: 74 BPM

## 2017-08-12 LAB
BASOPHILS # BLD AUTO: 0.01 K/UL
BASOPHILS NFR BLD: 0.2 %
DIFFERENTIAL METHOD: ABNORMAL
EOSINOPHIL # BLD AUTO: 0.2 K/UL
EOSINOPHIL NFR BLD: 3 %
ERYTHROCYTE [DISTWIDTH] IN BLOOD BY AUTOMATED COUNT: 19.2 %
HCT VFR BLD AUTO: 25.4 %
HGB BLD-MCNC: 7.8 G/DL
HYPOCHROMIA BLD QL SMEAR: ABNORMAL
LYMPHOCYTES # BLD AUTO: 1 K/UL
LYMPHOCYTES NFR BLD: 15.9 %
MCH RBC QN AUTO: 22.9 PG
MCHC RBC AUTO-ENTMCNC: 30.7 G/DL
MCV RBC AUTO: 75 FL
MONOCYTES # BLD AUTO: 0.7 K/UL
MONOCYTES NFR BLD: 11.6 %
NEUTROPHILS # BLD AUTO: 4.4 K/UL
NEUTROPHILS NFR BLD: 69.6 %
OVALOCYTES BLD QL SMEAR: ABNORMAL
PLATELET # BLD AUTO: 217 K/UL
PMV BLD AUTO: 9.8 FL
POCT GLUCOSE: 146 MG/DL (ref 70–110)
POCT GLUCOSE: 162 MG/DL (ref 70–110)
POIKILOCYTOSIS BLD QL SMEAR: SLIGHT
RBC # BLD AUTO: 3.4 M/UL
WBC # BLD AUTO: 6.29 K/UL

## 2017-08-12 PROCEDURE — 63600175 PHARM REV CODE 636 W HCPCS: Performed by: HOSPITALIST

## 2017-08-12 PROCEDURE — 85025 COMPLETE CBC W/AUTO DIFF WBC: CPT

## 2017-08-12 PROCEDURE — 25000003 PHARM REV CODE 250

## 2017-08-12 PROCEDURE — 36415 COLL VENOUS BLD VENIPUNCTURE: CPT

## 2017-08-12 PROCEDURE — 25000003 PHARM REV CODE 250: Performed by: HOSPITALIST

## 2017-08-12 PROCEDURE — C9113 INJ PANTOPRAZOLE SODIUM, VIA: HCPCS | Performed by: HOSPITALIST

## 2017-08-12 RX ADMIN — ATORVASTATIN CALCIUM 20 MG: 10 TABLET, FILM COATED ORAL at 09:08

## 2017-08-12 RX ADMIN — CARVEDILOL 12.5 MG: 6.25 TABLET, FILM COATED ORAL at 09:08

## 2017-08-12 RX ADMIN — PANTOPRAZOLE SODIUM 8 MG/HR: 40 INJECTION, POWDER, FOR SOLUTION INTRAVENOUS at 04:08

## 2017-08-12 RX ADMIN — AMLODIPINE BESYLATE 10 MG: 5 TABLET ORAL at 09:08

## 2017-08-12 NOTE — DISCHARGE SUMMARY
[facility] Medicine  Discharge Summary      Patient Name: Kiki Rosario  MRN: [id number]  Admission Date: 8/9/2017  Hospital Length of Stay: 3 days  Discharge Date and Time:  08/12/2017 12:22 PM  Attending Physician: Dharmesh Swift MD   Discharging Provider: Dharmesh Swift MD  Primary Care Provider: Karl Machuca MD      HPI:    This 89 y.o F with Anemia; Arthritis; DM; Encounter for blood transfusion; History of blood clots; HLD; HTN; and Stroke presents to the ED via EMS from [facility] for emergent evaluation of low H&H. The labs were not sent, but the pt's nursing home reports heavy vaginal bleeding. The pt denies nausea, emesis, abdominal pain, chest pain, fever, chills, sore throat, rhinorrhea, cough and chest. No prior tx,on further evaluation in ER,has been seen patient has no vaginal bleeding,but melanotic stool,she has been started on PPI drip and GI was consulted,paln was for EGD,but son did not want any procedure,no sign of active bleeding at this time.    Procedure(s) (LRB):  ESOPHAGOGASTRODUODENOSCOPY (EGD) (Left)      Indwelling Lines/Drains at time of discharge:   Lines/Drains/Airways          No matching active lines, drains, or airways        Hospital Course:   This 89 y.o F with Anemia; Arthritis; DM; Encounter for blood transfusion; History of blood clots; HLD; HTN; and Stroke presents to the ED via EMS from [facility] for emergent evaluation of low H&H. The labs were not sent, but the pt's nursing home reports heavy vaginal bleeding. The pt denies nausea, emesis, abdominal pain, chest pain, fever, chills, sore throat, rhinorrhea, cough and chest. No prior tx,on further evaluation in ER,has been seen patient has no vaginal bleeding,but melanotic stool,she has been started on PPI drip and GI was consulted,plan was for EGD,but son did not want any procedure,no sign of active bleeding during hospstalization has been seen,had some slight drop in  H/H.but hemodynamically stable.discontinued oral anticoagulation,since PE was 3 years ago and patient was niot SOB or hypoxic, and discharged back to NH with PPI.informed and  updated family.     Consults:   Consults         Status Ordering Provider     Inpatient consult to Gastroenterology  Once     Provider:  Nelly Fuller MD    Acknowledged CRUZ FIGUEROA     Inpatient consult to Social Work  Once     Provider:  (Not yet assigned)    Ordered JR JEFF          Significant Diagnostic Studies: Labs:   BMP: No results for input(s): GLU, NA, K, CL, CO2, BUN, CREATININE, CALCIUM, MG in the last 48 hours. and CBC   Recent Labs  Lab 08/11/17  0357 08/12/17  0534   WBC 7.02 6.29   HGB 7.6* 7.8*   HCT 25.5* 25.4*    217       Pending Diagnostic Studies:     Procedure Component Value Units Date/Time    H.Pylori Antibody IgG [[id number]] Collected:  08/10/17 1153    Order Status:  Sent Lab Status:  In process Updated:  08/10/17 1801    Specimen:  Blood from Blood         Final Active Diagnoses:    Diagnosis Date Noted POA    PRINCIPAL PROBLEM:  Gastrointestinal hemorrhage [K92.2] 08/09/2017 Yes    Hyperlipidemia [E78.5] 06/17/2013 Yes    Acute blood loss anemia [D62] 08/10/2017 Yes    Essential hypertension [I10] 07/31/2015 Yes    DM (diabetes mellitus) type II controlled peripheral vascular disorder [E11.51] 07/31/2015 Yes    History of pulmonary embolism [Z86.711] 07/31/2015 Yes      Problems Resolved During this Admission:    Diagnosis Date Noted Date Resolved POA      No new Assessment & Plan notes have been filed under this hospital service since the last note was generated.  Service: Hospital Medicine      Discharged Condition: stable    Disposition: Home or Self Care    Follow Up:  Follow-up Information     Karl Machuca MD In 1 week.    Specialty:  Internal Medicine  Contact information:  [address]  [address]  [place] [id number] [phone]             Karl Machuca MD  In 1 week.    Specialty:  Internal Medicine  Contact information:  [address]  [place] [id number] [phone]                 Patient Instructions:     Diet general     Diet general     Activity as tolerated     Activity as tolerated       Medications:  Reconciled Home Medications:   Current Discharge Medication List      START taking these medications    Details   lisinopril (PRINIVIL,ZESTRIL) 2.5 MG tablet Take 1 tablet (2.5 mg total) by mouth once daily.  Qty: 30 tablet, Refills: 0         CONTINUE these medications which have CHANGED    Details   hydrocodone-acetaminophen 5-325mg (NORCO) 5-325 mg per tablet Take 1 tablet by mouth every 6 (six) hours as needed for Pain.  Qty: 15 tablet, Refills: 0      omeprazole (PRILOSEC) 20 MG capsule Take 2 capsules (40 mg total) by mouth once daily.  Qty: 30 capsule, Refills: 0         CONTINUE these medications which have NOT CHANGED    Details   amlodipine (NORVASC) 10 MG tablet Take 10 mg by mouth once daily.      atorvastatin (LIPITOR) 20 MG tablet Take 20 mg by mouth once daily.      carvedilol (COREG) 12.5 MG tablet Take 12.5 mg by mouth 2 (two) times daily with meals.      ergocalciferol (VITAMIN D2) 50,000 unit Cap Take 50,000 Units by mouth every 7 days.      estrogens, conjugated, (PREMARIN) 0.625 MG tablet Take 0.625 mg by mouth once daily.      gabapentin (NEURONTIN) 300 MG capsule       INSULIN DEGLUDEC (TRESIBA FLEXTOUCH U-200 SUBQ) Inject into the skin.      polyethylene glycol (GLYCOLAX) 17 gram PwPk Take by mouth.      senna (SENOKOT) 8.6 mg tablet Take 1 tablet by mouth once daily.      albuterol-ipratropium 2.5mg-0.5mg/3mL (DUO-NEB) 0.5 mg-3 mg(2.5 mg base)/3 mL nebulizer solution Take 3 mLs by nebulization every 4 (four) hours.  Refills: 0      insulin glargine (LANTUS) 100 unit/mL injection Inject 10 Units into the skin every evening.      MYRBETRIQ 50 mg Tb24       trazodone (DESYREL) 50 MG tablet Take 25 mg by mouth every evening.           STOP taking these medications       aspirin 325 MG tablet Comments:   Reason for Stopping:         clopidogrel (PLAVIX) 75 mg tablet Comments:   Reason for Stopping:         rivaroxaban (XARELTO) 20 mg Tab Comments:   Reason for Stopping:             Time spent on the discharge of patient: 30  minutes    HOS POC IP DISCHARGE SUMMARY    Dharmesh Swift MD  Department of Hospital Medicine  [facility]

## 2017-08-12 NOTE — PLAN OF CARE
Problem: Patient Care Overview  Goal: Plan of Care Review  Outcome: Ongoing (interventions implemented as appropriate)  Pt remained free of falls/injury. Pt oriented to person. Displayed no discomfort. On continuous Protonix to right FA 22 gauge.  mg/dl at hs, no coverage needed. One BM during shift, no active bleeding noted. Tolerating diet well.

## 2017-08-12 NOTE — PROGRESS NOTES
TN called [facility] for transport @ [phone].  [facility] will arrive wihtin the hour.  NurseKeri  notified

## 2017-08-12 NOTE — CONSULTS
TN called [facility] ambulance for transport @ [phone].  Ambulance ([facility] transporter stated that pt had postural insubiliy and could not transfer safetly)will arrive wihtin the hour.  Nurse notified.

## 2017-08-14 LAB — H PYLORI IGG SERPL QL IA: POSITIVE

## 2017-08-19 ENCOUNTER — HOSPITAL ENCOUNTER (EMERGENCY)
Facility: HOSPITAL | Age: 82
End: 2017-08-19
Attending: EMERGENCY MEDICINE
Payer: MEDICARE

## 2017-08-19 VITALS
WEIGHT: 135 LBS | RESPIRATION RATE: 16 BRPM | BODY MASS INDEX: 23.05 KG/M2 | TEMPERATURE: 99 F | HEIGHT: 64 IN | DIASTOLIC BLOOD PRESSURE: 64 MMHG | HEART RATE: 70 BPM | SYSTOLIC BLOOD PRESSURE: 138 MMHG | OXYGEN SATURATION: 98 %

## 2017-08-19 DIAGNOSIS — R41.0 CONFUSION: ICD-10-CM

## 2017-08-19 DIAGNOSIS — N39.0 URINARY TRACT INFECTION WITH HEMATURIA, SITE UNSPECIFIED: Primary | ICD-10-CM

## 2017-08-19 DIAGNOSIS — R31.9 URINARY TRACT INFECTION WITH HEMATURIA, SITE UNSPECIFIED: Primary | ICD-10-CM

## 2017-08-19 LAB
ABO + RH BLD: NORMAL
ALBUMIN SERPL BCP-MCNC: 2.6 G/DL
ALP SERPL-CCNC: 85 U/L
ALT SERPL W/O P-5'-P-CCNC: <5 U/L
AMORPH CRY URNS QL MICRO: ABNORMAL
ANION GAP SERPL CALC-SCNC: 11 MMOL/L
ANISOCYTOSIS BLD QL SMEAR: SLIGHT
APTT BLDCRRT: 34.7 SEC
AST SERPL-CCNC: 12 U/L
BACTERIA #/AREA URNS HPF: ABNORMAL /HPF
BASOPHILS # BLD AUTO: 0.02 K/UL
BASOPHILS NFR BLD: 0.2 %
BILIRUB SERPL-MCNC: 0.7 MG/DL
BILIRUB UR QL STRIP: NEGATIVE
BLD GP AB SCN CELLS X3 SERPL QL: NORMAL
BUN SERPL-MCNC: 24 MG/DL
CALCIUM SERPL-MCNC: 9.3 MG/DL
CHLORIDE SERPL-SCNC: 107 MMOL/L
CLARITY UR: ABNORMAL
CO2 SERPL-SCNC: 24 MMOL/L
COLOR UR: ABNORMAL
CREAT SERPL-MCNC: 1.2 MG/DL
DACRYOCYTES BLD QL SMEAR: ABNORMAL
DIFFERENTIAL METHOD: ABNORMAL
EOSINOPHIL # BLD AUTO: 0.2 K/UL
EOSINOPHIL NFR BLD: 1.9 %
ERYTHROCYTE [DISTWIDTH] IN BLOOD BY AUTOMATED COUNT: 18.3 %
EST. GFR  (AFRICAN AMERICAN): 44 ML/MIN/1.73 M^2
EST. GFR  (NON AFRICAN AMERICAN): 39 ML/MIN/1.73 M^2
GLUCOSE SERPL-MCNC: 215 MG/DL
GLUCOSE UR QL STRIP: NEGATIVE
HCT VFR BLD AUTO: 26.9 %
HGB BLD-MCNC: 8.1 G/DL
HGB UR QL STRIP: ABNORMAL
HYALINE CASTS #/AREA URNS LPF: 0 /LPF
HYPOCHROMIA BLD QL SMEAR: ABNORMAL
INR PPP: 1.1
KETONES UR QL STRIP: NEGATIVE
LEUKOCYTE ESTERASE UR QL STRIP: ABNORMAL
LYMPHOCYTES # BLD AUTO: 0.7 K/UL
LYMPHOCYTES NFR BLD: 8.1 %
MAGNESIUM SERPL-MCNC: 2 MG/DL
MCH RBC QN AUTO: 22.3 PG
MCHC RBC AUTO-ENTMCNC: 30.1 G/DL
MCV RBC AUTO: 74 FL
MICROSCOPIC COMMENT: ABNORMAL
MONOCYTES # BLD AUTO: 1.1 K/UL
MONOCYTES NFR BLD: 12.3 %
NEUTROPHILS # BLD AUTO: 6.9 K/UL
NEUTROPHILS NFR BLD: 77.9 %
NITRITE UR QL STRIP: NEGATIVE
NON-SQ EPI CELLS #/AREA URNS HPF: 1 /HPF
OVALOCYTES BLD QL SMEAR: ABNORMAL
PH UR STRIP: 6 [PH] (ref 5–8)
PLATELET # BLD AUTO: 129 K/UL
PMV BLD AUTO: 10.1 FL
POIKILOCYTOSIS BLD QL SMEAR: SLIGHT
POLYCHROMASIA BLD QL SMEAR: ABNORMAL
POTASSIUM SERPL-SCNC: 3.5 MMOL/L
PROT SERPL-MCNC: 6.8 G/DL
PROT UR QL STRIP: ABNORMAL
PROTHROMBIN TIME: 12.1 SEC
RBC # BLD AUTO: 3.63 M/UL
RBC #/AREA URNS HPF: 10 /HPF (ref 0–4)
SODIUM SERPL-SCNC: 142 MMOL/L
SP GR UR STRIP: 1.01 (ref 1–1.03)
SQUAMOUS #/AREA URNS HPF: 2 /HPF
STOMATOCYTES BLD QL SMEAR: PRESENT
TARGETS BLD QL SMEAR: ABNORMAL
URN SPEC COLLECT METH UR: ABNORMAL
UROBILINOGEN UR STRIP-ACNC: ABNORMAL EU/DL
WBC # BLD AUTO: 8.91 K/UL
WBC #/AREA URNS HPF: >100 /HPF (ref 0–5)
WBC CLUMPS URNS QL MICRO: ABNORMAL

## 2017-08-19 PROCEDURE — 87186 SC STD MICRODIL/AGAR DIL: CPT

## 2017-08-19 PROCEDURE — 80053 COMPREHEN METABOLIC PANEL: CPT

## 2017-08-19 PROCEDURE — 63600175 PHARM REV CODE 636 W HCPCS: Performed by: EMERGENCY MEDICINE

## 2017-08-19 PROCEDURE — 87086 URINE CULTURE/COLONY COUNT: CPT

## 2017-08-19 PROCEDURE — 93010 ELECTROCARDIOGRAM REPORT: CPT | Mod: ,,, | Performed by: INTERNAL MEDICINE

## 2017-08-19 PROCEDURE — 96365 THER/PROPH/DIAG IV INF INIT: CPT

## 2017-08-19 PROCEDURE — 99284 EMERGENCY DEPT VISIT MOD MDM: CPT | Mod: 25

## 2017-08-19 PROCEDURE — 87088 URINE BACTERIA CULTURE: CPT

## 2017-08-19 PROCEDURE — 93005 ELECTROCARDIOGRAM TRACING: CPT

## 2017-08-19 PROCEDURE — 86900 BLOOD TYPING SEROLOGIC ABO: CPT

## 2017-08-19 PROCEDURE — 81000 URINALYSIS NONAUTO W/SCOPE: CPT

## 2017-08-19 PROCEDURE — 83735 ASSAY OF MAGNESIUM: CPT

## 2017-08-19 PROCEDURE — 86850 RBC ANTIBODY SCREEN: CPT

## 2017-08-19 PROCEDURE — 87077 CULTURE AEROBIC IDENTIFY: CPT

## 2017-08-19 PROCEDURE — 85610 PROTHROMBIN TIME: CPT

## 2017-08-19 PROCEDURE — 85730 THROMBOPLASTIN TIME PARTIAL: CPT

## 2017-08-19 PROCEDURE — 85025 COMPLETE CBC W/AUTO DIFF WBC: CPT

## 2017-08-19 RX ORDER — CEPHALEXIN 500 MG/1
1000 CAPSULE ORAL EVERY 12 HOURS
Qty: 28 CAPSULE | Refills: 0 | Status: SHIPPED | OUTPATIENT
Start: 2017-08-19 | End: 2017-08-26

## 2017-08-19 RX ADMIN — CEFTRIAXONE 1 G: 1 INJECTION, SOLUTION INTRAVENOUS at 11:08

## 2017-08-19 NOTE — ED PROVIDER NOTES
"Encounter Date: 8/19/2017    SCRIBE #1 NOTE: I, Birdie Hardy, am scribing for, and in the presence of, Antony Kirkpatrick MD. Other sections scribed: HPI/ROS.       History     Chief Complaint   Patient presents with    Fatigue     Pt brought to ED from [facility] for evaluation of weakness following discharge several days. Pt previously admitted for rectal bleeding.      CC: Fatigue    Pt is a 89 y.o. female w/ HTN, DM, anemia, HLD, arthritis, CAD, renal disorder and hx of UTI, blood clots, stroke (25 years ago), and pneumonia presenting to the ED via EMS from [facility] for evaluation of weakness, fatigue, and altered mental status. Pt's son states she seemed weak, confused, and "not herself" this morning. Pt states she is not eating or drinking normally. Pt's son states her CBG was checked today and he was told it was normal. Pt states, "I feel fine." Pt also has redness to the L hand.    Pt was admitted on 8/9/17 for rectal bleeding and was discharged on 8/12/17. Pt denies pain. Pt reports no further symptoms.        The history is provided by the patient and a relative. The history is limited by the condition of the patient.     Review of patient's allergies indicates:  No Known Allergies  Past Medical History:   Diagnosis Date    Anemia     Arthritis     Coronary artery disease     Diabetes mellitus     Encounter for blood transfusion     History of blood clots     Hyperlipidemia     Hypertension     Pneumonia     Renal disorder     AKF    Stroke     25yrs ago    UTI (urinary tract infection)      Past Surgical History:   Procedure Laterality Date    APPENDECTOMY      ILIAC ARTERY STENT      LEG AMPUTATION THROUGH LOWER TIBIA AND FIBULA Right      History reviewed. No pertinent family history.  Social History   Substance Use Topics    Smoking status: Former Smoker    Smokeless tobacco: Never Used    Alcohol use No     Review of Systems   Unable to perform ROS: Mental status change "   Constitutional: Positive for fatigue. Negative for fever.   HENT: Negative for postnasal drip and sore throat.    Eyes: Negative for visual disturbance.   Respiratory: Negative for shortness of breath.    Cardiovascular: Negative for chest pain and leg swelling.   Gastrointestinal: Negative for diarrhea, nausea and vomiting.   Genitourinary: Negative for dysuria, hematuria and urgency.   Musculoskeletal: Negative for arthralgias and myalgias.   Skin: Positive for rash.   Neurological: Positive for weakness. Negative for headaches.       Physical Exam     Initial Vitals [08/19/17 0937]   BP Pulse Resp Temp SpO2   131/68 72 16 99.3 °F (37.4 °C) 99 %      MAP       89         Physical Exam    Nursing note and vitals reviewed.  HENT:   Head: Atraumatic.   Eyes: Conjunctivae and EOM are normal.   Neck: Normal range of motion.   Cardiovascular: Exam reveals no gallop and no friction rub.    No murmur heard.  Pulmonary/Chest: Breath sounds normal. No respiratory distress.   Abdominal: Soft. She exhibits no distension. There is no tenderness. There is no rebound.   Musculoskeletal: Normal range of motion. She exhibits no edema.   Neurological: She is alert and oriented to person, place, and time. She has normal strength. No cranial nerve deficit or sensory deficit.   Psychiatric: She has a normal mood and affect.         ED Course   Procedures  Labs Reviewed   COMPREHENSIVE METABOLIC PANEL - Abnormal; Notable for the following:        Result Value    Glucose 215 (*)     Albumin 2.6 (*)     ALT <5 (*)     eGFR if  44 (*)     eGFR if non  39 (*)     All other components within normal limits   CBC W/ AUTO DIFFERENTIAL - Abnormal; Notable for the following:     RBC 3.63 (*)     Hemoglobin 8.1 (*)     Hematocrit 26.9 (*)     MCV 74 (*)     MCH 22.3 (*)     MCHC 30.1 (*)     RDW 18.3 (*)     Platelets 129 (*)     Lymph # 0.7 (*)     Mono # 1.1 (*)     Gran% 77.9 (*)     Lymph% 8.1 (*)     All  other components within normal limits   APTT - Abnormal; Notable for the following:     aPTT 34.7 (*)     All other components within normal limits   URINALYSIS - Abnormal; Notable for the following:     Appearance, UA Cloudy (*)     Protein, UA 2+ (*)     Occult Blood UA 2+ (*)     Urobilinogen, UA 4.0-6.0 (*)     Leukocytes, UA 2+ (*)     All other components within normal limits   URINALYSIS MICROSCOPIC - Abnormal; Notable for the following:     RBC, UA 10 (*)     WBC, UA >100 (*)     WBC Clumps, UA Many (*)     Bacteria, UA Many (*)     Non-Squam Epith 1 (*)     All other components within normal limits   CULTURE, URINE   PROTIME-INR   MAGNESIUM   TYPE & SCREEN             Medical Decision Making:   Initial Assessment:   89-year-old female brought in from nursing home for evaluation of altered mental status this morning.  Son says that she seemed confused and weak.  Patient has no complaints at this time.  Vital signs unremarkable.  There is a report of redness to left hand suspected to be related to IV sticks recently.  Patient was admitted recently for upper GI bleed.  No EGD was performed.  Labs today showed no leukocytosis or worsening anemia.  Metabolic panel unremarkable.  Urinalysis was catheterized and that show evidence of UTI.  Ceftriaxone given.  No evidence of sepsis.  I think patient can be discharged home with oral antibiotics.  Return instructions given.  Primary care follow-up.            Scribe Attestation:   Scribe #1: I performed the above scribed service and the documentation accurately describes the services I performed. I attest to the accuracy of the note.    Attending Attestation:           Physician Attestation for Scribe:  Physician Attestation Statement for Scribe #1: I, Antony Kirkpatrick MD, reviewed documentation, as scribed by Birdie Hardy in my presence, and it is both accurate and complete.                 ED Course     Clinical Impression:   The primary encounter diagnosis was  Urinary tract infection with hematuria, site unspecified. A diagnosis of Confusion was also pertinent to this visit.                           Antony Kirkpatrick MD  08/19/17 7252

## 2017-08-19 NOTE — ED TRIAGE NOTES
approx 2 weeks ago pt admitted here for rectal bleed.  Discharge and back to [facility] x 4 days ago.  Son was present this am stating she is altered mental status.  Called pt's md and was sent here for eval.  Pt without complaints at this time.  Denies chest pains, sob, n/v/d or fever.

## 2017-08-21 LAB — BACTERIA UR CULT: NORMAL

## 2017-08-29 NOTE — PROGRESS NOTES
8/28 Spoke with Dr Hercules about results of test which resulted positive for H Pylori.  TN faxed to [facility] at [phone].    8/29 TN followed up with NAOMIE Thrasher at [facility] who planned to have nurse speak with Dr Sotomayor about test results.